# Patient Record
Sex: FEMALE | Race: WHITE | NOT HISPANIC OR LATINO | Employment: OTHER | URBAN - METROPOLITAN AREA
[De-identification: names, ages, dates, MRNs, and addresses within clinical notes are randomized per-mention and may not be internally consistent; named-entity substitution may affect disease eponyms.]

---

## 2023-03-29 PROBLEM — N60.11 FIBROCYSTIC CHANGES OF RIGHT BREAST: Status: ACTIVE | Noted: 2023-03-29

## 2023-03-29 PROBLEM — R10.9 ABDOMINAL PAIN: Status: ACTIVE | Noted: 2023-03-29

## 2023-03-29 PROBLEM — R92.0 MICROCALCIFICATIONS OF THE BREAST: Status: ACTIVE | Noted: 2023-03-29

## 2023-03-29 PROBLEM — B37.31 YEAST INFECTION OF THE VAGINA: Status: ACTIVE | Noted: 2023-03-29

## 2023-03-29 PROBLEM — D05.11 DUCTAL CARCINOMA IN SITU (DCIS) OF RIGHT BREAST: Status: ACTIVE | Noted: 2023-03-29

## 2023-03-29 PROBLEM — N64.89 PSEUDOANGIOMATOUS STROMAL HYPERPLASIA OF BREAST: Status: ACTIVE | Noted: 2023-03-29

## 2023-03-29 PROBLEM — I10 ESSENTIAL HYPERTENSION: Status: ACTIVE | Noted: 2023-03-29

## 2023-03-29 PROBLEM — F41.1 ANXIETY REACTION: Status: ACTIVE | Noted: 2023-03-29

## 2023-03-29 PROBLEM — N89.8 VAGINAL DISCHARGE: Status: ACTIVE | Noted: 2023-03-29

## 2023-03-29 PROBLEM — R03.0 ELEVATED BLOOD PRESSURE READING: Status: ACTIVE | Noted: 2023-03-29

## 2023-03-29 RX ORDER — ACETAMINOPHEN 500 MG
TABLET ORAL
COMMUNITY
Start: 2021-10-25

## 2023-03-29 RX ORDER — POLYETHYLENE GLYCOL 3350, SODIUM CHLORIDE, SODIUM BICARBONATE, POTASSIUM CHLORIDE 420; 11.2; 5.72; 1.48 G/4L; G/4L; G/4L; G/4L
POWDER, FOR SOLUTION ORAL
COMMUNITY
Start: 2022-11-08 | End: 2023-05-09

## 2023-05-09 ENCOUNTER — OFFICE VISIT (OUTPATIENT)
Dept: PRIMARY CARE | Facility: CLINIC | Age: 58
End: 2023-05-09
Payer: COMMERCIAL

## 2023-05-09 VITALS
SYSTOLIC BLOOD PRESSURE: 152 MMHG | WEIGHT: 114.8 LBS | OXYGEN SATURATION: 99 % | HEART RATE: 115 BPM | HEIGHT: 60 IN | BODY MASS INDEX: 22.54 KG/M2 | DIASTOLIC BLOOD PRESSURE: 82 MMHG

## 2023-05-09 DIAGNOSIS — R74.8 ELEVATED ALKALINE PHOSPHATASE LEVEL: Primary | ICD-10-CM

## 2023-05-09 DIAGNOSIS — E78.00 PURE HYPERCHOLESTEROLEMIA: ICD-10-CM

## 2023-05-09 PROCEDURE — 1036F TOBACCO NON-USER: CPT | Performed by: FAMILY MEDICINE

## 2023-05-09 PROCEDURE — 99214 OFFICE O/P EST MOD 30 MIN: CPT | Performed by: FAMILY MEDICINE

## 2023-05-09 PROCEDURE — 3077F SYST BP >= 140 MM HG: CPT | Performed by: FAMILY MEDICINE

## 2023-05-09 PROCEDURE — 3079F DIAST BP 80-89 MM HG: CPT | Performed by: FAMILY MEDICINE

## 2023-05-09 ASSESSMENT — PAIN SCALES - GENERAL: PAINLEVEL: 0-NO PAIN

## 2023-05-09 NOTE — PROGRESS NOTES
Subjective   Patient ID: Lucie Tobar is a 57 y.o. female who presents for Follow-up (Follow up and cholesterol check. ).    HPI patient here for checkup.  We reviewed her past labs which showed her cholesterol climbed up.  She has been working hard on healthy diet, weight loss and exercise.  She is anxious to see her new lab results.    Review of Systems    Objective   /82 (BP Location: Left arm, Patient Position: Sitting, BP Cuff Size: Adult)   Pulse (!) 115   Ht 1.524 m (5')   Wt 52.1 kg (114 lb 12.8 oz)   SpO2 99%   BMI 22.42 kg/m²     Physical Exam  Alert, pleasant and in no acute distress.  Neck: Supple, no JVD or carotid bruit  Heart: Regular rate and rhythm, no murmur  Lungs: Clear to auscultation  Lower extremities: No edema    Assessment/Plan   Problem List Items Addressed This Visit    None  Visit Diagnoses       Elevated alkaline phosphatase level    -  Primary    Relevant Orders    Hepatic function panel    Pure hypercholesterolemia        Relevant Orders    Lipid Panel        Patient here for follow-up.  She has worked hard on diet and exercise.  We will recheck her lipid panel and liver panel.  Plan to call in 3 to 5 days with results.

## 2023-05-10 ENCOUNTER — LAB (OUTPATIENT)
Dept: LAB | Facility: LAB | Age: 58
End: 2023-05-10
Payer: COMMERCIAL

## 2023-05-10 DIAGNOSIS — E78.00 PURE HYPERCHOLESTEROLEMIA: ICD-10-CM

## 2023-05-10 DIAGNOSIS — R74.8 ELEVATED ALKALINE PHOSPHATASE LEVEL: ICD-10-CM

## 2023-05-10 LAB
ALANINE AMINOTRANSFERASE (SGPT) (U/L) IN SER/PLAS: 8 U/L (ref 7–45)
ALBUMIN (G/DL) IN SER/PLAS: 4 G/DL (ref 3.4–5)
ALKALINE PHOSPHATASE (U/L) IN SER/PLAS: 126 U/L (ref 33–110)
ASPARTATE AMINOTRANSFERASE (SGOT) (U/L) IN SER/PLAS: 14 U/L (ref 9–39)
BILIRUBIN DIRECT (MG/DL) IN SER/PLAS: 0.1 MG/DL (ref 0–0.3)
BILIRUBIN TOTAL (MG/DL) IN SER/PLAS: 0.7 MG/DL (ref 0–1.2)
CHOLESTEROL (MG/DL) IN SER/PLAS: 206 MG/DL (ref 0–199)
CHOLESTEROL IN HDL (MG/DL) IN SER/PLAS: 83.7 MG/DL
CHOLESTEROL/HDL RATIO: 2.5
LDL: 110 MG/DL (ref 0–99)
PROTEIN TOTAL: 6.6 G/DL (ref 6.4–8.2)
TRIGLYCERIDE (MG/DL) IN SER/PLAS: 60 MG/DL (ref 0–149)
VLDL: 12 MG/DL (ref 0–40)

## 2023-05-10 PROCEDURE — 80061 LIPID PANEL: CPT

## 2023-05-10 PROCEDURE — 80076 HEPATIC FUNCTION PANEL: CPT

## 2023-05-10 PROCEDURE — 36415 COLL VENOUS BLD VENIPUNCTURE: CPT

## 2023-11-09 ENCOUNTER — OFFICE VISIT (OUTPATIENT)
Dept: PRIMARY CARE | Facility: CLINIC | Age: 58
End: 2023-11-09
Payer: COMMERCIAL

## 2023-11-09 VITALS
HEIGHT: 58 IN | SYSTOLIC BLOOD PRESSURE: 146 MMHG | BODY MASS INDEX: 22.75 KG/M2 | WEIGHT: 108.4 LBS | DIASTOLIC BLOOD PRESSURE: 74 MMHG | HEART RATE: 85 BPM | OXYGEN SATURATION: 98 %

## 2023-11-09 DIAGNOSIS — Z00.00 HEALTHCARE MAINTENANCE: Primary | ICD-10-CM

## 2023-11-09 DIAGNOSIS — R74.8 ELEVATED ALKALINE PHOSPHATASE LEVEL: ICD-10-CM

## 2023-11-09 DIAGNOSIS — E78.00 PURE HYPERCHOLESTEROLEMIA: ICD-10-CM

## 2023-11-09 PROCEDURE — 1036F TOBACCO NON-USER: CPT | Performed by: FAMILY MEDICINE

## 2023-11-09 PROCEDURE — 3078F DIAST BP <80 MM HG: CPT | Performed by: FAMILY MEDICINE

## 2023-11-09 PROCEDURE — 99396 PREV VISIT EST AGE 40-64: CPT | Performed by: FAMILY MEDICINE

## 2023-11-09 PROCEDURE — 3077F SYST BP >= 140 MM HG: CPT | Performed by: FAMILY MEDICINE

## 2023-11-09 ASSESSMENT — PAIN SCALES - GENERAL: PAINLEVEL: 0-NO PAIN

## 2023-11-09 ASSESSMENT — ENCOUNTER SYMPTOMS: DEPRESSION: 0

## 2023-11-09 NOTE — PROGRESS NOTES
"Subjective   Patient ID: Lucie Tobar is a 58 y.o. female who presents for Follow-up (6 month follow up. ).    HPI   Patient here for annual checkup.  She is generally doing well.    Patient exercises regularly.    Review of Systems  Cardiovascular: no chest pain, no edema, no palpitations, and no syncope.   Respiratory: no cough,no shortness of breath, and no wheezing  Gastrointestinal: no abdominal pain, nausea, vomiting or blood in stools  Genitourinary: no dysuria or hematuria  Objective   /74 (BP Location: Left arm, Patient Position: Sitting, BP Cuff Size: Adult)   Pulse 85   Ht 1.485 m (4' 10.47\")   Wt 49.2 kg (108 lb 6.4 oz)   SpO2 98%   BMI 22.30 kg/m²     Physical Exam  Alert, pleasant and in no acute distress.  Neck: Supple, no JVD or carotid bruit  Heart: Regular rate and rhythm, no murmur  Lungs: Clear to auscultation  Lower extremities: No edema    Assessment/Plan   Problem List Items Addressed This Visit    None  Visit Diagnoses         Codes    Healthcare maintenance    -  Primary Z00.00    Relevant Orders    HIV-1 and HIV-2 antibodies    Hepatitis C antibody    Pure hypercholesterolemia     E78.00    Relevant Orders    Lipid Panel    Elevated alkaline phosphatase level     R74.8    Relevant Orders    Hepatic function panel        1.  Health maintenance: Care gaps addressed  2.  Hypercholesterolemia: We will recheck lipid panel.  3.  Elevated alkaline phosphatase: We will recheck level.  Reassurance provided.  Level has been only mildly elevated and stable.       "

## 2023-11-10 ENCOUNTER — LAB (OUTPATIENT)
Dept: LAB | Facility: LAB | Age: 58
End: 2023-11-10
Payer: COMMERCIAL

## 2023-11-10 DIAGNOSIS — Z00.00 HEALTHCARE MAINTENANCE: ICD-10-CM

## 2023-11-10 DIAGNOSIS — E78.00 PURE HYPERCHOLESTEROLEMIA: ICD-10-CM

## 2023-11-10 DIAGNOSIS — R74.8 ELEVATED ALKALINE PHOSPHATASE LEVEL: ICD-10-CM

## 2023-11-10 LAB
ALBUMIN SERPL BCP-MCNC: 4.2 G/DL (ref 3.4–5)
ALP SERPL-CCNC: 128 U/L (ref 33–110)
ALT SERPL W P-5'-P-CCNC: 8 U/L (ref 7–45)
AST SERPL W P-5'-P-CCNC: 15 U/L (ref 9–39)
BILIRUB DIRECT SERPL-MCNC: 0.1 MG/DL (ref 0–0.3)
BILIRUB SERPL-MCNC: 0.8 MG/DL (ref 0–1.2)
CHOLEST SERPL-MCNC: 199 MG/DL (ref 0–199)
CHOLESTEROL/HDL RATIO: 2.3
HCV AB SER QL: NONREACTIVE
HDLC SERPL-MCNC: 87.7 MG/DL
HIV 1+2 AB+HIV1 P24 AG SERPL QL IA: NONREACTIVE
LDLC SERPL CALC-MCNC: 99 MG/DL
NON HDL CHOLESTEROL: 111 MG/DL (ref 0–149)
PROT SERPL-MCNC: 6.5 G/DL (ref 6.4–8.2)
TRIGL SERPL-MCNC: 61 MG/DL (ref 0–149)
VLDL: 12 MG/DL (ref 0–40)

## 2023-11-10 PROCEDURE — 87389 HIV-1 AG W/HIV-1&-2 AB AG IA: CPT

## 2023-11-10 PROCEDURE — 80061 LIPID PANEL: CPT

## 2023-11-10 PROCEDURE — 36415 COLL VENOUS BLD VENIPUNCTURE: CPT

## 2023-11-10 PROCEDURE — 80076 HEPATIC FUNCTION PANEL: CPT

## 2023-11-10 PROCEDURE — 86803 HEPATITIS C AB TEST: CPT

## 2024-04-24 ENCOUNTER — HOSPITAL ENCOUNTER (OUTPATIENT)
Dept: RADIOLOGY | Facility: CLINIC | Age: 59
Discharge: HOME | End: 2024-04-24
Payer: COMMERCIAL

## 2024-04-24 ENCOUNTER — OFFICE VISIT (OUTPATIENT)
Dept: SURGERY | Facility: CLINIC | Age: 59
End: 2024-04-24
Payer: COMMERCIAL

## 2024-04-24 VITALS
TEMPERATURE: 97.7 F | WEIGHT: 112 LBS | BODY MASS INDEX: 21.99 KG/M2 | HEIGHT: 60 IN | OXYGEN SATURATION: 99 % | SYSTOLIC BLOOD PRESSURE: 191 MMHG | RESPIRATION RATE: 18 BRPM | DIASTOLIC BLOOD PRESSURE: 95 MMHG | HEART RATE: 84 BPM

## 2024-04-24 DIAGNOSIS — N60.11 FIBROCYSTIC CHANGES OF RIGHT BREAST: ICD-10-CM

## 2024-04-24 DIAGNOSIS — Z12.31 ENCOUNTER FOR SCREENING MAMMOGRAM FOR MALIGNANT NEOPLASM OF BREAST: ICD-10-CM

## 2024-04-24 DIAGNOSIS — N64.89 OTHER SPECIFIED DISORDERS OF BREAST: ICD-10-CM

## 2024-04-24 DIAGNOSIS — D05.11 DUCTAL CARCINOMA IN SITU (DCIS) OF RIGHT BREAST: ICD-10-CM

## 2024-04-24 DIAGNOSIS — N64.89 PSEUDOANGIOMATOUS STROMAL HYPERPLASIA OF BREAST: ICD-10-CM

## 2024-04-24 PROCEDURE — 77063 BREAST TOMOSYNTHESIS BI: CPT | Mod: BILATERAL PROCEDURE | Performed by: RADIOLOGY

## 2024-04-24 PROCEDURE — 99214 OFFICE O/P EST MOD 30 MIN: CPT | Performed by: SURGERY

## 2024-04-24 PROCEDURE — 77067 SCR MAMMO BI INCL CAD: CPT | Mod: BILATERAL PROCEDURE | Performed by: RADIOLOGY

## 2024-04-24 PROCEDURE — 1036F TOBACCO NON-USER: CPT | Performed by: SURGERY

## 2024-04-24 PROCEDURE — 77067 SCR MAMMO BI INCL CAD: CPT

## 2024-04-24 PROCEDURE — 3080F DIAST BP >= 90 MM HG: CPT | Performed by: SURGERY

## 2024-04-24 PROCEDURE — 3077F SYST BP >= 140 MM HG: CPT | Performed by: SURGERY

## 2024-04-24 SDOH — ECONOMIC STABILITY: FOOD INSECURITY: WITHIN THE PAST 12 MONTHS, YOU WORRIED THAT YOUR FOOD WOULD RUN OUT BEFORE YOU GOT MONEY TO BUY MORE.: NEVER TRUE

## 2024-04-24 SDOH — ECONOMIC STABILITY: FOOD INSECURITY: WITHIN THE PAST 12 MONTHS, THE FOOD YOU BOUGHT JUST DIDN'T LAST AND YOU DIDN'T HAVE MONEY TO GET MORE.: NEVER TRUE

## 2024-04-24 ASSESSMENT — PAIN SCALES - GENERAL: PAINLEVEL: 0-NO PAIN

## 2024-04-24 ASSESSMENT — COLUMBIA-SUICIDE SEVERITY RATING SCALE - C-SSRS
2. HAVE YOU ACTUALLY HAD ANY THOUGHTS OF KILLING YOURSELF?: NO
1. IN THE PAST MONTH, HAVE YOU WISHED YOU WERE DEAD OR WISHED YOU COULD GO TO SLEEP AND NOT WAKE UP?: NO
6. HAVE YOU EVER DONE ANYTHING, STARTED TO DO ANYTHING, OR PREPARED TO DO ANYTHING TO END YOUR LIFE?: NO

## 2024-04-24 ASSESSMENT — LIFESTYLE VARIABLES
HOW OFTEN DO YOU HAVE A DRINK CONTAINING ALCOHOL: MONTHLY OR LESS
SKIP TO QUESTIONS 9-10: 1
AUDIT-C TOTAL SCORE: 1
HOW OFTEN DO YOU HAVE SIX OR MORE DRINKS ON ONE OCCASION: NEVER
HOW MANY STANDARD DRINKS CONTAINING ALCOHOL DO YOU HAVE ON A TYPICAL DAY: 1 OR 2

## 2024-04-24 ASSESSMENT — ENCOUNTER SYMPTOMS
OCCASIONAL FEELINGS OF UNSTEADINESS: 0
DEPRESSION: 0
LOSS OF SENSATION IN FEET: 0

## 2024-04-24 ASSESSMENT — PATIENT HEALTH QUESTIONNAIRE - PHQ9
1. LITTLE INTEREST OR PLEASURE IN DOING THINGS: NOT AT ALL
SUM OF ALL RESPONSES TO PHQ9 QUESTIONS 1 & 2: 0
2. FEELING DOWN, DEPRESSED OR HOPELESS: NOT AT ALL

## 2024-04-24 NOTE — PROGRESS NOTES
History Of Present Illness :  Lucie Tobar is a 58 y.o. female who presents for her yearly breast cancer follow-up.  The patient was last seen by me on 4/19/2023.  Please see the note(s) in legacy  [employer] for details.    The patient has no complaints with regard of the breasts such as mass, pain, nipple discharge, or skin or nipple change.    Patient underwent bilateral digital screening mammography with tomosynthesis this morning.  I personally reviewed the report and the images.  This is negative.  She has heterogeneously dense breast tissue bilaterally.  There is some minimal postsurgical scar changes in the upper outer quadrant of the right breast.  Otherwise, no suspicious densities or microcalcifications bilaterally.    4/19/2023:  Lucie presents for her 1 year follow-up. She is now 6 years out from her lumpectomy.     She completed her tamoxifen in June 2022. She was seen by me just prior, in April of that year. The patient remains in good health.     Patient continues to do well. She has no complaints with regard to the breasts such as mass, pain, nipple discharge, or skin or nipple changes. She remains on tamoxifen and is tolerating that well.     Patient underwent bilateral screening mammography with tomosynthesis this morning. I personally reviewed the report and the images. There has been no change since her previous in 2022. She has fibroglandular to heterogeneously dense breast tissue bilaterally. There is some minimal postsurgical scar changes upper outer quadrant right breast posteriorly. No suspicious densities or microcalcifications bilaterally.        4/13/2022:  She was last seen by me on 10/20/2021. Please see that note for details.     She is now 5 years out from her operation and will complete the tamoxifen in June of this year.     Patient continues to do well. She has no complaints with regard to the breasts such as mass, pain, nipple discharge, or skin or nipple changes. She  remains on tamoxifen and is tolerating that well.     Patient underwent bilateral screening mammography with tomosynthesis this morning on 4/13/2022. Personal reviewed the report and the images. There is been no change since her previous 4/7/2021. She has fibroglandular to heterogeneously dense breast tissue bilaterally. There is some minimal postsurgical scar changes upper outer quadrant right breast posteriorly. No suspicious densities or microcalcifications bilaterally.     No new medical problems since her last visit. In the meantime, the patient has established care with a primary medical physician, Dr. Tabitha Shields in [place] on 10/25/2021.     10/20/2021:  She was last seen by me on 4/19/2021. Please see that note for details.     Patient continues to do well. She has no complaints with regard to the breasts such as mass, pain, nipple discharge, or skin or nipple changes. She remains on tamoxifen and is tolerating that well.     She is fully vaccinated for COVID-19. Pfizer. She also is vaccinated for influenza.     No new medical problems since her last visit.     4/19/2021:  Lucie was last seen by me on 10/7/2020. Please see that note for details. She has no complaints with regard to her breast such as mass, pain, nipple discharge, or skin or nipple changes.     She remains on tamoxifen and is tolerating well.     Lucie underwent a bilateral digital screening mammogram with tomosynthesis on 4/7/2021. I personally reviewed the report and the images on 4/14/21. Again she has heterogeneously dense breast tissue. In the right breast, there is some minimal post operative surgical changes/scarring in the upper outer quadrant mid depth. Stable from previous. No suspicious densities or microcalcifications bilaterally.     4/8/2020:  Lucie had her bilateral diagnostic mammogram with tomosynthesis performed on 4/6/2020. I've reviewed the report and the images. She has heterogeneously dense breast tissue. There is some  very minimal postoperative changes in the upper outer quadrant of the right breast, mid to posterior depth. Otherwise there no suspicious densities or microcalcifications bilaterally.     I called Lucie this morning and discussed the mammogram results with her. I reassured her that this was essentially a normal mammogram. She is experiencing no breast symptoms. She continues to tolerate tamoxifen well. She does have a supply through at least June of this year.     Plan:     Lucie will see me in the office for a clinical examination following lifting of the COVID19 restrictions, or at the latest in October 2020 if possible.     I will refill the tamoxifen when appropriate     She will call otherwise as needed     Mammogram in a year     10/9/19:  Six-month breast follow-up. No complaints with regard the breast such as mass, pain, nipple discharge, or skin or nipple changes. Continues to tolerate the tamoxifen well.     4/10/19:  Six-month breast follow-up. No complaints with regard the breast such as mass, pain, nipple discharge, or skin or nipple changes. Continues to tolerate the tamoxifen well.     Bilateral digital diagnostic mammogram with CAD and tomosynthesis was performed on 4/2/19. I personally reviewed the report and the images on 4/8/2019. There is heterogeneous dense breast tissue bilaterally. There is some very minimal postsurgical scar changes of the right breast in the upper outer quadrant. Bilaterally there is some very small scattered punctate microcalcifications. With regard of the left breast, 12:00 position mid depth, slightly lateral, there is a stable well-circumscribed 1.3 cm density consistent with a cyst which is unchanged from previous. No suspicious densities are microcalcifications bilaterally.     11/2/17:  Lucie was seen by me in the office on 9/13/2017. Please see that note for details. The plan at that time was to obtain a baseline right diagnostic mammogram in October, and then see me  after her bilateral diagnostic mammogram in April 2018.     Lucei did obtain a right diagnostic mammogram with tomosynthesis on 10/23/2017 at [facility]. I have reviewed the report, and the images personally on 10/26/2017. I compared the images with previous screening from 4/3/2017, and a right diagnostic from 4/7/2017. I've also again reviewed the specimen images from both 4/19/2017 and 5/11/2017.     Overall, this baseline mammogram was satisfactory. The breast tissue is again heterogeneously dense. There are some minimal postsurgical scar changes in the upper outer quadrant of the right breast posteriorly. This was a far posterior lesion. There were a few residual subtle amorphous calcifications far posteriorly, posterior to the lumpectomy site. On reviewing the specimen mammograph, the calcifications in question were completely removed in toto.     Impression: Satisfactory baseline postlumpectomy mammogram of the right breast. There are a few residual calcifications. The DCIS and associated calcifications were completely removed. The margin of the lumpectomy with regard to the DCIS was very large, greater than 10 mm. I'm not worried about any recurrent or residual disease. Breast MRI is not indicated at this point.     Plan:  1. Continue tamoxifen  2. Bilateral diagnostic mammogram April 2018 and then follow-up with me     I discussed these findings and plan personally on the phone with Lucie on 10/30/2017. She agrees with the plan.      9/13/17:  Patient last seen by me for her second postoperative visit on 6/21/2017. Tamoxifen prescribed at that point. She's tolerating that well with no side effects. She has no complaints with regard to breast such as mass pain discharge, skin or nipple changes.     5/17/17:  Lucie is status post needle-localized right breast lumpectomy on 5/11/2017 as an outpatient at [facility]. The pathology revealed a small residual focus of ductal carcinoma in situ, low nuclear grade with associated  microcalcification, adjacent to the prior biopsy site. Also noted were fibrocystic changes, proliferative type, with associated microcalcifications. She also had focal evidence of pseudo-angiomatous stromal hyperplasia ((PASH). The total diameter of the DCIS, including that from the previous stereotactic guided core biopsy, is 5 mm. The margins greater than 10 mm. There is no evidence of necrosis. This is low-grade. I discussed this report with the pathologist.     Based on the modified [place] prognostic index (VNPI), the pathology correlates to a size score of 1, margin of 1, grade 1, and age of 2, for a total of 5. This can be treated with excision alone. Radiation therapy is not recommended. 12 year recurrence rate less than / = 6%     Lucie's postoperative course is been unremarkable, except for significant bruising the upper outer quadrant of the right breast. She's taken no analgesics postoperatively.     4/25/17:  Lucie underwent a stereotactically-guided mammotome biopsy of the right breast on 4/19/2017 at [facility]. This was for the calcifications in the upper outer quadrant of the right breast posteriorly. Specimen mammograph did confirm excision of the calcifications in question.      The pathology revealed ductal carcinoma in situ, low grade, cribriform type, with the associated microcalcifications. This was ER positive and DC negative. Also noted were proliferative fibrocystic changes.      Past Medical History   Past Medical History:   Diagnosis Date    Acquired absence of both cervix and uterus     H/O: hysterectomy    Hypertrophy of uterus     Enlarged uterus    Personal history of diseases of the skin and subcutaneous tissue     History of cyst of breast    Personal history of other benign neoplasm     History of uterine leiomyoma    Personal history of other diseases of the female genital tract     Personal history of endometriosis    Personal history of other endocrine, nutritional and metabolic  disease     History of thyroid disease    Unspecified abdominal hernia without obstruction or gangrene     Hernia    Unspecified lump in unspecified breast     Breast nodule        Surgical History    Past Surgical History:   Procedure Laterality Date    HERNIA REPAIR  09/23/2014    Hernia Repair    HYSTERECTOMY  10/09/2014    Hysterectomy    OTHER SURGICAL HISTORY  09/23/2014    Breast Surgery Excision Of Breast Single Lesion    OTHER SURGICAL HISTORY  09/23/2014    Surgery        Allergies   Allergies   Allergen Reactions    Sulfa (Sulfonamide Antibiotics) Unknown    Sulfamethoxazole Unknown    Sulfamethoxazole-Trimethoprim Unknown        Home Meds  Current Outpatient Medications   Medication Instructions    blood pressure monitor kit USE AS DIRECTED        Family History    No family history on file.     Social History  Social History     Tobacco Use    Smoking status: Never    Smokeless tobacco: Never   Substance Use Topics    Alcohol use: Not Currently    Drug use: Never        Review Of Systems    Review of Systems    General: Not Present- Obesity, Cancer, HIV, MRSA, Recent Cold/Flu, Tired During the Day and VRE.  HEENT: Not Present- Migraine, Cataracts, Glaucoma, Macular Degeneration and Retinal Detachment.  Respiratory: Not Present- Asthma, Chronic Cough, Difficulty Breathing on Exertion, Difficulty Breathing at Rest, Emphysema, Frequent Bronchitis, Home CPAP/BiPAP, Home Oxygen, Pulmonary Embolus, Pneumonia/TB, Sleep Apnea and Snoring.  Cardiovascular: Not Present- Chest Pain, Congestive Heart Failure, Heart Attack, Coronary Artery Disease, Heart Stent, High Cholesterol/Lipids, Internal Defibrillator, Irregular Heart Beat, Mitral Valve Prolapse, Murmur, Pacemaker and Peripheral Vascular Disease.  Gastrointestinal: Not Present- Heartburn, Hepatitis, Hiatal Hernia, Jaundice, Reflux, Stomach Ulcer and IBS.  Female Genitourinary: Not Present- Kidney Failure, Kidney Stones, Dialysis and Urinary Tract  Infection.  Musculoskeletal: Not Present- Arthritis, Back Pain and Fibromyalgia.  Neurological: Not Present- Headaches, Numbness, Tingling, Seizures, Stroke,  Shunt and Weakness.  Psychiatric: Not Present-  Bipolar, Depression and Panic Attacks.  Endocrine: Not Present- Diabetes, Hyperthyroidism, Hypothyroidism and Low Blood Sugar.  Hematology: Not Present- Abnormal Bleeding, Anemia and Blood Clots.    Vitals  There were no vitals taken for this visit.     Physical Exam   Breast Physical Exam     General  Mental Status - Alert. General Appearance - Not Anxious, in acute distress or Sickly. Orientation - Oriented X3. Build  & Nutrition - Well nourished. Posture - Normal posture. Gait - Normal. Hydration - Well hydrated. Voice - Normal.     Integumentary  Global Assessment: Upon inspection and palpation of skin surfaces of the - Head/Face: no rashes, ulcers, lesions or evidence of photo damage. No palpable nodules or masses, Neck: no visible lesions or palpable masses, Chest: no  swelling,scarring or lesions. No prominent arteries or veins observed and Abdomen: no scars, rashes or lesions.     Breast  Nipples: Characteristics - Bilateral - Normal. Discharge - Bilateral - None.  Breast - Bilateral - Non Tender. No Bulging, Dimpling, Inflammation or Scarring. Symmetric. Small.  Breast Lump: - No Palpable Breast Mass.  Right breast: 5 cm curvilinear lumpectomy incision, just off the areolar border, upper outer quadrant; 2.0 cm superior to the mid incision, there is about a 1 cm area of linear contraction transversely.     Lymphatic  Head & Neck  General Head & Neck Lymphatics:  Bilateral: Description - No Localized lymphadenopathy. Overlying skin - Normal.  Axillary  General Axillary Region:  Bilateral: Description - No Localized lymphadenopathy. Tenderness - Non Tender.     Assessment/Plan     Lucie's clinical and mammographic follow-up is satisfactory.    Breast diagnoses:  Ductal carcinoma in situ (DCIS) of right  breast   Tamoxifen completed June 2022   Tamoxifen started June 2017   Low grade; ER (+) and ND (-); 5 mm; VNPI Score of 5 (12 year recurrence rate less than /= 6%)  Status post needle localized right breast lumpectomy 5/11/2017 [facility]   Upper outer quadrant; status post stereotactic guided mammotome biopsy, 4/19/2017     Fibrocystic changes of right breast   Noted on pathology from lumpectomy of the right breast 5/11/2017   Noted on pathology from stereotactically-guided mammotome biopsy of      the right breast 4/19/2017      Pseudoangiomatous stromal hyperplasia of breast    Noted on pathology from lumpectomy of the right breast 5/11/2017    Recommendations:    1. Continue self breast exams monthly     2. Bilateral digital screening mammography with tomosynthesis in 1 year, the morning of Wednesday, 4/30/25, [facility]     3. Patient will follow up with me that morning, immediately thereafter at the [facility] breast clinic in the same building, [place] [id number], for mammogram review, and a clinical recheck.

## 2025-04-27 NOTE — PROGRESS NOTES
Established Patient Visit     Lucie presents for her yearly breast cancer follow-up.    The patient has no complaints with regard of the breasts such as mass, pain, nipple discharge, or skin or nipple change.     Patient underwent bilateral digital screening mammography with tomosynthesis this morning.  I personally reviewed the report and the images.  This is negative.  She has heterogeneously dense breast tissue bilaterally.  There is some minimal postsurgical scar changes in the upper outer quadrant of the right breast.  Otherwise, no suspicious densities or microcalcifications bilaterally.     4/24/2024:  Lucie Tobar is a 58 y.o. female who presents for her yearly breast cancer follow-up.  The patient was last seen by me on 4/19/2023.  Please see the note(s) in legacy  [employer] for details.     The patient has no complaints with regard of the breasts such as mass, pain, nipple discharge, or skin or nipple change.     Patient underwent bilateral digital screening mammography with tomosynthesis this morning.  I personally reviewed the report and the images.  This is negative.  She has heterogeneously dense breast tissue bilaterally.  There is some minimal postsurgical scar changes in the upper outer quadrant of the right breast.  Otherwise, no suspicious densities or microcalcifications bilaterally.     4/19/2023:  Lucie presents for her 1 year follow-up. She is now 6 years out from her lumpectomy.     She completed her tamoxifen in June 2022. She was seen by me just prior, in April of that year. The patient remains in good health.     Patient continues to do well. She has no complaints with regard to the breasts such as mass, pain, nipple discharge, or skin or nipple changes. She remains on tamoxifen and is tolerating that well.     Patient underwent bilateral screening mammography with tomosynthesis this morning. I personally reviewed the report and the images. There has been no change since her previous  in 2022. She has fibroglandular to heterogeneously dense breast tissue bilaterally. There is some minimal postsurgical scar changes upper outer quadrant right breast posteriorly. No suspicious densities or microcalcifications bilaterally.        4/13/2022:  She was last seen by me on 10/20/2021. Please see that note for details.     She is now 5 years out from her operation and will complete the tamoxifen in June of this year.     Patient continues to do well. She has no complaints with regard to the breasts such as mass, pain, nipple discharge, or skin or nipple changes. She remains on tamoxifen and is tolerating that well.     Patient underwent bilateral screening mammography with tomosynthesis this morning on 4/13/2022. Personal reviewed the report and the images. There is been no change since her previous 4/7/2021. She has fibroglandular to heterogeneously dense breast tissue bilaterally. There is some minimal postsurgical scar changes upper outer quadrant right breast posteriorly. No suspicious densities or microcalcifications bilaterally.     No new medical problems since her last visit. In the meantime, the patient has established care with a primary medical physician, Dr. Tabitha Shields in [place] on 10/25/2021.     10/20/2021:  She was last seen by me on 4/19/2021. Please see that note for details.     Patient continues to do well. She has no complaints with regard to the breasts such as mass, pain, nipple discharge, or skin or nipple changes. She remains on tamoxifen and is tolerating that well.     She is fully vaccinated for COVID-19. Pfizer. She also is vaccinated for influenza.     No new medical problems since her last visit.     4/19/2021:  Lucie was last seen by me on 10/7/2020. Please see that note for details. She has no complaints with regard to her breast such as mass, pain, nipple discharge, or skin or nipple changes.     She remains on tamoxifen and is tolerating well.     Lucie underwent a  bilateral digital screening mammogram with tomosynthesis on 4/7/2021. I personally reviewed the report and the images on 4/14/21. Again she has heterogeneously dense breast tissue. In the right breast, there is some minimal post operative surgical changes/scarring in the upper outer quadrant mid depth. Stable from previous. No suspicious densities or microcalcifications bilaterally.     4/8/2020:  Lucie had her bilateral diagnostic mammogram with tomosynthesis performed on 4/6/2020. I've reviewed the report and the images. She has heterogeneously dense breast tissue. There is some very minimal postoperative changes in the upper outer quadrant of the right breast, mid to posterior depth. Otherwise there no suspicious densities or microcalcifications bilaterally.     I called Lucie this morning and discussed the mammogram results with her. I reassured her that this was essentially a normal mammogram. She is experiencing no breast symptoms. She continues to tolerate tamoxifen well. She does have a supply through at least June of this year.     Plan:     Lucie will see me in the office for a clinical examination following lifting of the COVID19 restrictions, or at the latest in October 2020 if possible.     I will refill the tamoxifen when appropriate     She will call otherwise as needed     Mammogram in a year     10/9/19:  Six-month breast follow-up. No complaints with regard the breast such as mass, pain, nipple discharge, or skin or nipple changes. Continues to tolerate the tamoxifen well.     4/10/19:  Six-month breast follow-up. No complaints with regard the breast such as mass, pain, nipple discharge, or skin or nipple changes. Continues to tolerate the tamoxifen well.     Bilateral digital diagnostic mammogram with CAD and tomosynthesis was performed on 4/2/19. I personally reviewed the report and the images on 4/8/2019. There is heterogeneous dense breast tissue bilaterally. There is some very minimal  postsurgical scar changes of the right breast in the upper outer quadrant. Bilaterally there is some very small scattered punctate microcalcifications. With regard of the left breast, 12:00 position mid depth, slightly lateral, there is a stable well-circumscribed 1.3 cm density consistent with a cyst which is unchanged from previous. No suspicious densities are microcalcifications bilaterally.     11/2/17:  Lucie was seen by me in the office on 9/13/2017. Please see that note for details. The plan at that time was to obtain a baseline right diagnostic mammogram in October, and then see me after her bilateral diagnostic mammogram in April 2018.     Lucie did obtain a right diagnostic mammogram with tomosynthesis on 10/23/2017 at [facility]. I have reviewed the report, and the images personally on 10/26/2017. I compared the images with previous screening from 4/3/2017, and a right diagnostic from 4/7/2017. I've also again reviewed the specimen images from both 4/19/2017 and 5/11/2017.     Overall, this baseline mammogram was satisfactory. The breast tissue is again heterogeneously dense. There are some minimal postsurgical scar changes in the upper outer quadrant of the right breast posteriorly. This was a far posterior lesion. There were a few residual subtle amorphous calcifications far posteriorly, posterior to the lumpectomy site. On reviewing the specimen mammograph, the calcifications in question were completely removed in toto.     Impression: Satisfactory baseline postlumpectomy mammogram of the right breast. There are a few residual calcifications. The DCIS and associated calcifications were completely removed. The margin of the lumpectomy with regard to the DCIS was very large, greater than 10 mm. I'm not worried about any recurrent or residual disease. Breast MRI is not indicated at this point.     Plan:  1. Continue tamoxifen  2. Bilateral diagnostic mammogram April 2018 and then follow-up with me     I  discussed these findings and plan personally on the phone with Lucie on 10/30/2017. She agrees with the plan.      9/13/17:  Patient last seen by me for her second postoperative visit on 6/21/2017. Tamoxifen prescribed at that point. She's tolerating that well with no side effects. She has no complaints with regard to breast such as mass pain discharge, skin or nipple changes.     5/17/17:  Lucie is status post needle-localized right breast lumpectomy on 5/11/2017 as an outpatient at [facility]. The pathology revealed a small residual focus of ductal carcinoma in situ, low nuclear grade with associated microcalcification, adjacent to the prior biopsy site. Also noted were fibrocystic changes, proliferative type, with associated microcalcifications. She also had focal evidence of pseudo-angiomatous stromal hyperplasia ((PASH). The total diameter of the DCIS, including that from the previous stereotactic guided core biopsy, is 5 mm. The margins greater than 10 mm. There is no evidence of necrosis. This is low-grade. I discussed this report with the pathologist.     Based on the modified [place] prognostic index (VNPI), the pathology correlates to a size score of 1, margin of 1, grade 1, and age of 2, for a total of 5. This can be treated with excision alone. Radiation therapy is not recommended. 12 year recurrence rate less than / = 6%     Lucie's postoperative course is been unremarkable, except for significant bruising the upper outer quadrant of the right breast. She's taken no analgesics postoperatively.     4/25/17:  Lucie underwent a stereotactically-guided mammotome biopsy of the right breast on 4/19/2017 at [facility]. This was for the calcifications in the upper outer quadrant of the right breast posteriorly. Specimen mammograph did confirm excision of the calcifications in question.      The pathology revealed ductal carcinoma in situ, low grade, cribriform type, with the associated microcalcifications. This was ER  positive and [place] negative. Also noted were proliferative fibrocystic changes.        Past Medical History   Medical History        Past Medical History:   Diagnosis Date    Acquired absence of both cervix and uterus       H/O: hysterectomy    Hypertrophy of uterus       Enlarged uterus    Personal history of diseases of the skin and subcutaneous tissue       History of cyst of breast    Personal history of other benign neoplasm       History of uterine leiomyoma    Personal history of other diseases of the female genital tract       Personal history of endometriosis    Personal history of other endocrine, nutritional and metabolic disease       History of thyroid disease    Unspecified abdominal hernia without obstruction or gangrene       Hernia    Unspecified lump in unspecified breast       Breast nodule            Surgical History    Surgical History         Past Surgical History:   Procedure Laterality Date    HERNIA REPAIR   09/23/2014     Hernia Repair    HYSTERECTOMY   10/09/2014     Hysterectomy    OTHER SURGICAL HISTORY   09/23/2014     Breast Surgery Excision Of Breast Single Lesion    OTHER SURGICAL HISTORY   09/23/2014     Surgery            Allergies   RX Allergies        Allergies   Allergen Reactions    Sulfa (Sulfonamide Antibiotics) Unknown    Sulfamethoxazole Unknown    Sulfamethoxazole-Trimethoprim Unknown            Home Meds       Current Outpatient Medications   Medication Instructions    blood pressure monitor kit USE AS DIRECTED         Family History    Family History   No family history on file.         Social History  Social History   Social History           Tobacco Use    Smoking status: Never    Smokeless tobacco: Never   Substance Use Topics    Alcohol use: Not Currently    Drug use: Never            Review Of Systems    Review of Systems     General: Not Present- Obesity, Cancer, HIV, MRSA, Recent Cold/Flu, Tired During the Day and VRE.  HEENT: Not Present- Migraine, Cataracts,  Glaucoma, Macular Degeneration and Retinal Detachment.  Respiratory: Not Present- Asthma, Chronic Cough, Difficulty Breathing on Exertion, Difficulty Breathing at Rest, Emphysema, Frequent Bronchitis, Home CPAP/BiPAP, Home Oxygen, Pulmonary Embolus, Pneumonia/TB, Sleep Apnea and Snoring.  Cardiovascular: Not Present- Chest Pain, Congestive Heart Failure, Heart Attack, Coronary Artery Disease, Heart Stent, High Cholesterol/Lipids, Internal Defibrillator, Irregular Heart Beat, Mitral Valve Prolapse, Murmur, Pacemaker and Peripheral Vascular Disease.  Gastrointestinal: Not Present- Heartburn, Hepatitis, Hiatal Hernia, Jaundice, Reflux, Stomach Ulcer and IBS.  Female Genitourinary: Not Present- Kidney Failure, Kidney Stones, Dialysis and Urinary Tract Infection.  Musculoskeletal: Not Present- Arthritis, Back Pain and Fibromyalgia.  Neurological: Not Present- Headaches, Numbness, Tingling, Seizures, Stroke,  Shunt and Weakness.  Psychiatric: Not Present-  Bipolar, Depression and Panic Attacks.  Endocrine: Not Present- Diabetes, Hyperthyroidism, Hypothyroidism and Low Blood Sugar.  Hematology: Not Present- Abnormal Bleeding, Anemia and Blood Clots.     Vitals  There were no vitals taken for this visit.      Physical Exam   Breast Physical Exam     General  Mental Status - Alert. General Appearance - Not Anxious, in acute distress or Sickly. Orientation - Oriented X3. Build  & Nutrition - Well nourished. Posture - Normal posture. Gait - Normal. Hydration - Well hydrated. Voice - Normal.     Integumentary  Global Assessment: Upon inspection and palpation of skin surfaces of the - Head/Face: no rashes, ulcers, lesions or evidence of photo damage. No palpable nodules or masses, Neck: no visible lesions or palpable masses, Chest: no  swelling,scarring or lesions. No prominent arteries or veins observed and Abdomen: no scars, rashes or lesions.     Breast  Nipples: Characteristics - Bilateral - Normal. Discharge - Bilateral -  None.  Breast - Bilateral - Non Tender. No Bulging, Dimpling, Inflammation or Scarring. Symmetric. Small.  Breast Lump: - No Palpable Breast Mass.  Right breast: 5 cm curvilinear lumpectomy incision, just off the areolar border, upper outer quadrant; 2.0 cm superior to the mid incision, there is about a 1 cm area of linear contraction transversely.     Lymphatic  Head & Neck  General Head & Neck Lymphatics:  Bilateral: Description - No Localized lymphadenopathy. Overlying skin - Normal.  Axillary  General Axillary Region:  Bilateral: Description - No Localized lymphadenopathy. Tenderness - Non Tender.      Assessment/Plan      Lucie's clinical and mammographic follow-up is satisfactory.     Breast Diagnoses:  Ductal carcinoma in situ (DCIS) of right breast   Tamoxifen completed June 2022   Tamoxifen started June 2017   Low grade; ER (+) and RI (-); 5 mm; VNPI Score of 5 (12 year recurrence rate less than /= 6%)   Status post needle localized right breast lumpectomy 5/11/2017 [facility]   Upper outer quadrant; status post stereotactic guided mammotome biopsy, 4/19/2017      Fibrocystic changes of right breast   Noted on pathology from lumpectomy of the right breast 5/11/2017   Noted on pathology from stereotactically-guided mammotome biopsy of      the right breast 4/19/2017       Pseudoangiomatous stromal hyperplasia of breast    Noted on pathology from lumpectomy of the right breast 5/11/2017     Recommendations:     1. Continue self breast exams monthly     2. Bilateral digital screening mammography with tomosynthesis in 1 year, the morning of Wednesday[place]2026[place] [facility] [place]     3. Patient will follow up with me that morning, immediately thereafter at the [facility] breast clinic in the same building, [place] [id number], for mammogram review, and a clinical recheck.

## 2025-04-30 ENCOUNTER — OFFICE VISIT (OUTPATIENT)
Dept: SURGERY | Facility: CLINIC | Age: 60
End: 2025-04-30
Payer: COMMERCIAL

## 2025-04-30 ENCOUNTER — HOSPITAL ENCOUNTER (OUTPATIENT)
Dept: RADIOLOGY | Facility: CLINIC | Age: 60
Discharge: HOME | End: 2025-04-30
Payer: COMMERCIAL

## 2025-04-30 VITALS
BODY MASS INDEX: 22.97 KG/M2 | RESPIRATION RATE: 10 BRPM | HEART RATE: 83 BPM | SYSTOLIC BLOOD PRESSURE: 183 MMHG | TEMPERATURE: 98 F | HEIGHT: 60 IN | WEIGHT: 117 LBS | OXYGEN SATURATION: 98 % | DIASTOLIC BLOOD PRESSURE: 109 MMHG

## 2025-04-30 DIAGNOSIS — Z12.31 ENCOUNTER FOR SCREENING MAMMOGRAM FOR MALIGNANT NEOPLASM OF BREAST: ICD-10-CM

## 2025-04-30 DIAGNOSIS — D05.11 DUCTAL CARCINOMA IN SITU (DCIS) OF RIGHT BREAST: ICD-10-CM

## 2025-04-30 DIAGNOSIS — N64.89 PSEUDOANGIOMATOUS STROMAL HYPERPLASIA OF BREAST: ICD-10-CM

## 2025-04-30 DIAGNOSIS — N60.11 FIBROCYSTIC CHANGES OF RIGHT BREAST: ICD-10-CM

## 2025-04-30 DIAGNOSIS — D05.11 DUCTAL CARCINOMA IN SITU (DCIS) OF RIGHT BREAST: Primary | ICD-10-CM

## 2025-04-30 PROBLEM — R69 DISORDER: Status: ACTIVE | Noted: 2025-04-30

## 2025-04-30 PROBLEM — E78.00 PURE HYPERCHOLESTEROLEMIA: Status: ACTIVE | Noted: 2023-05-10

## 2025-04-30 PROBLEM — Z86.39 HISTORY OF THYROID DISORDER: Status: ACTIVE | Noted: 2025-04-30

## 2025-04-30 PROBLEM — R74.8 HIGH ALKALINE PHOSPHATASE: Status: ACTIVE | Noted: 2023-05-10

## 2025-04-30 PROBLEM — N85.2 ENLARGED UTERUS: Status: ACTIVE | Noted: 2025-04-30

## 2025-04-30 PROCEDURE — 77067 SCR MAMMO BI INCL CAD: CPT

## 2025-04-30 PROCEDURE — 3008F BODY MASS INDEX DOCD: CPT | Performed by: SURGERY

## 2025-04-30 PROCEDURE — 3077F SYST BP >= 140 MM HG: CPT | Performed by: SURGERY

## 2025-04-30 PROCEDURE — 99214 OFFICE O/P EST MOD 30 MIN: CPT | Performed by: SURGERY

## 2025-04-30 PROCEDURE — 77063 BREAST TOMOSYNTHESIS BI: CPT | Performed by: RADIOLOGY

## 2025-04-30 PROCEDURE — 3080F DIAST BP >= 90 MM HG: CPT | Performed by: SURGERY

## 2025-04-30 PROCEDURE — 77067 SCR MAMMO BI INCL CAD: CPT | Performed by: RADIOLOGY

## 2025-04-30 PROCEDURE — 1036F TOBACCO NON-USER: CPT | Performed by: SURGERY

## 2025-04-30 SDOH — ECONOMIC STABILITY: FOOD INSECURITY: WITHIN THE PAST 12 MONTHS, YOU WORRIED THAT YOUR FOOD WOULD RUN OUT BEFORE YOU GOT MONEY TO BUY MORE.: NEVER TRUE

## 2025-04-30 SDOH — ECONOMIC STABILITY: FOOD INSECURITY: WITHIN THE PAST 12 MONTHS, THE FOOD YOU BOUGHT JUST DIDN'T LAST AND YOU DIDN'T HAVE MONEY TO GET MORE.: NEVER TRUE

## 2025-04-30 ASSESSMENT — ENCOUNTER SYMPTOMS
DEPRESSION: 0
OCCASIONAL FEELINGS OF UNSTEADINESS: 0
LOSS OF SENSATION IN FEET: 0

## 2025-04-30 ASSESSMENT — COLUMBIA-SUICIDE SEVERITY RATING SCALE - C-SSRS
6. HAVE YOU EVER DONE ANYTHING, STARTED TO DO ANYTHING, OR PREPARED TO DO ANYTHING TO END YOUR LIFE?: NO
2. HAVE YOU ACTUALLY HAD ANY THOUGHTS OF KILLING YOURSELF?: NO
1. IN THE PAST MONTH, HAVE YOU WISHED YOU WERE DEAD OR WISHED YOU COULD GO TO SLEEP AND NOT WAKE UP?: NO

## 2025-04-30 ASSESSMENT — ANXIETY QUESTIONNAIRES
7. FEELING AFRAID AS IF SOMETHING AWFUL MIGHT HAPPEN: NOT AT ALL
IF YOU CHECKED OFF ANY PROBLEMS ON THIS QUESTIONNAIRE, HOW DIFFICULT HAVE THESE PROBLEMS MADE IT FOR YOU TO DO YOUR WORK, TAKE CARE OF THINGS AT HOME, OR GET ALONG WITH OTHER PEOPLE: NOT DIFFICULT AT ALL
3. WORRYING TOO MUCH ABOUT DIFFERENT THINGS: NOT AT ALL
6. BECOMING EASILY ANNOYED OR IRRITABLE: NOT AT ALL
4. TROUBLE RELAXING: NOT AT ALL
5. BEING SO RESTLESS THAT IT IS HARD TO SIT STILL: NOT AT ALL
1. FEELING NERVOUS, ANXIOUS, OR ON EDGE: NOT AT ALL
2. NOT BEING ABLE TO STOP OR CONTROL WORRYING: NOT AT ALL
GAD7 TOTAL SCORE: 0

## 2025-04-30 ASSESSMENT — LIFESTYLE VARIABLES
AUDIT-C TOTAL SCORE: 0
HOW OFTEN DO YOU HAVE A DRINK CONTAINING ALCOHOL: NEVER
HOW OFTEN DO YOU HAVE SIX OR MORE DRINKS ON ONE OCCASION: NEVER
HOW MANY STANDARD DRINKS CONTAINING ALCOHOL DO YOU HAVE ON A TYPICAL DAY: PATIENT DOES NOT DRINK
SKIP TO QUESTIONS 9-10: 1

## 2025-04-30 ASSESSMENT — PAIN SCALES - GENERAL: PAINLEVEL_OUTOF10: 0-NO PAIN
